# Patient Record
Sex: FEMALE | Race: WHITE | NOT HISPANIC OR LATINO | URBAN - METROPOLITAN AREA
[De-identification: names, ages, dates, MRNs, and addresses within clinical notes are randomized per-mention and may not be internally consistent; named-entity substitution may affect disease eponyms.]

---
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
## 2023-01-23 PROCEDURE — 82607 VITAMIN B-12: CPT | Performed by: FAMILY MEDICINE

## 2023-01-23 PROCEDURE — 82746 ASSAY OF FOLIC ACID SERUM: CPT | Performed by: FAMILY MEDICINE

## 2024-01-30 PROBLEM — Z00.00 WELLNESS EXAMINATION: Status: ACTIVE | Noted: 2024-01-30

## 2024-01-31 PROBLEM — E28.2 PCOS (POLYCYSTIC OVARIAN SYNDROME): Status: ACTIVE | Noted: 2024-01-31

## 2024-05-06 PROBLEM — Z00.00 WELLNESS EXAMINATION: Status: RESOLVED | Noted: 2024-01-30 | Resolved: 2024-05-06

## 2025-06-27 ENCOUNTER — ON-DEMAND VIRTUAL (OUTPATIENT)
Dept: URGENT CARE | Facility: CLINIC | Age: 25
End: 2025-06-27
Payer: COMMERCIAL

## 2025-06-27 DIAGNOSIS — M62.838 MUSCLE SPASMS OF NECK: Primary | ICD-10-CM

## 2025-06-27 RX ORDER — CYCLOBENZAPRINE HCL 10 MG
10 TABLET ORAL 3 TIMES DAILY PRN
Qty: 15 TABLET | Refills: 0 | Status: SHIPPED | OUTPATIENT
Start: 2025-06-27 | End: 2025-07-02

## 2025-06-27 NOTE — PROGRESS NOTES
Subjective:      Patient ID: Elizabeth Nance is a 24 y.o. female.    Vitals:  vitals were not taken for this visit.     Chief Complaint: Neck Pain      Visit Type: TELE AUDIOVISUAL    Patient Location: Home     Present with the patient at the time of consultation: TELEMED PRESENT WITH PATIENT: None    Past Medical History:   Diagnosis Date    Skull fracture 2006     Past Surgical History:   Procedure Laterality Date    WISDOM TOOTH EXTRACTION N/A 01/2024     Review of patient's allergies indicates:   Allergen Reactions    Ibuprofen Shortness Of Breath and Swelling     Medications Ordered Prior to Encounter[1]  Family History   Problem Relation Name Age of Onset    No Known Problems Mother      Chronic back pain Father      No Known Problems Sister      No Known Problems Sister      Skin cancer Paternal Grandfather Tucker Nance - skin cancer        Medications Ordered                [facility] - [place] - [address]   [address], [place] [address]    Telephone: [phone]   Fax: [phone]   Hours: Not open 24 hours                         E-Prescribed (1 of 1)              cyclobenzaprine (FLEXERIL) 10 MG tablet    Sig: Take 1 tablet (10 mg total) by mouth 3 (three) times daily as needed for Muscle spasms.       Start: 6/27/25     Quantity: 15 tablet Refills: 0                           [facility] Odvv Intake    6/27/2025  6:55 AM CDT - Filed by Patient   What is your current physical address in the event of a medical emergency? [address] [address]   Are you able to take your vital signs? No   Please attach any relevant images or files    Is your employer contracted with [facility]? No         5 days of neck pain on right side. Getting worse. Moving head is painful. Worked out on Monday and noticed pain that night. Heat helps and ice does not.     Neck Pain         Neck: Positive for neck pain and neck stiffness.        Objective:   The physical exam was  conducted virtually.  Physical Exam   Constitutional:      Comments:Pain with turning head to the right     Abdominal: Normal appearance.   Neurological: She is alert.       Assessment:     1. Muscle spasms of neck        Plan:       Muscle spasms of neck    Other orders  -     cyclobenzaprine (FLEXERIL) 10 MG tablet; Take 1 tablet (10 mg total) by mouth 3 (three) times daily as needed for Muscle spasms.  Dispense: 15 tablet; Refill: 0                          [1]   Current Outpatient Medications on File Prior to Visit   Medication Sig Dispense Refill    MAGNESIUM GLYCINATE ORAL Take by mouth.      multivitamin (ONE DAILY MULTIVITAMIN) per tablet Take 1 tablet by mouth once daily.      TURMERIC ORAL Take by mouth.       No current facility-administered medications on file prior to visit.